# Patient Record
Sex: MALE | Race: ASIAN | NOT HISPANIC OR LATINO
[De-identification: names, ages, dates, MRNs, and addresses within clinical notes are randomized per-mention and may not be internally consistent; named-entity substitution may affect disease eponyms.]

---

## 2021-10-18 PROBLEM — Z00.00 ENCOUNTER FOR PREVENTIVE HEALTH EXAMINATION: Status: ACTIVE | Noted: 2021-10-18

## 2021-11-03 DIAGNOSIS — Q76.2 CONGENITAL SPONDYLOLISTHESIS: ICD-10-CM

## 2021-11-03 DIAGNOSIS — M50.20 OTHER CERVICAL DISC DISPLACEMENT, UNSPECIFIED CERVICAL REGION: ICD-10-CM

## 2021-11-03 DIAGNOSIS — M48.062 SPINAL STENOSIS, LUMBAR REGION WITH NEUROGENIC CLAUDICATION: ICD-10-CM

## 2021-11-03 RX ORDER — CYCLOBENZAPRINE HCL 5 MG
TABLET ORAL
Refills: 0 | Status: ACTIVE | COMMUNITY

## 2021-11-03 RX ORDER — TRAMADOL HYDROCHLORIDE 25 MG/1
TABLET, COATED ORAL
Refills: 0 | Status: ACTIVE | COMMUNITY

## 2021-11-04 ENCOUNTER — RESULT REVIEW (OUTPATIENT)
Age: 36
End: 2021-11-04

## 2021-11-04 ENCOUNTER — APPOINTMENT (OUTPATIENT)
Dept: SPINE | Facility: CLINIC | Age: 36
End: 2021-11-04
Payer: COMMERCIAL

## 2021-11-04 ENCOUNTER — OUTPATIENT (OUTPATIENT)
Dept: OUTPATIENT SERVICES | Facility: HOSPITAL | Age: 36
LOS: 1 days | End: 2021-11-04
Payer: COMMERCIAL

## 2021-11-04 VITALS
HEIGHT: 67 IN | WEIGHT: 180 LBS | HEART RATE: 68 BPM | BODY MASS INDEX: 28.25 KG/M2 | TEMPERATURE: 97.8 F | OXYGEN SATURATION: 99 % | SYSTOLIC BLOOD PRESSURE: 120 MMHG | DIASTOLIC BLOOD PRESSURE: 73 MMHG

## 2021-11-04 DIAGNOSIS — Z87.891 PERSONAL HISTORY OF NICOTINE DEPENDENCE: ICD-10-CM

## 2021-11-04 DIAGNOSIS — Z78.9 OTHER SPECIFIED HEALTH STATUS: ICD-10-CM

## 2021-11-04 PROCEDURE — 72114 X-RAY EXAM L-S SPINE BENDING: CPT | Mod: 26

## 2021-11-04 PROCEDURE — 99203 OFFICE O/P NEW LOW 30 MIN: CPT

## 2021-11-04 PROCEDURE — 72114 X-RAY EXAM L-S SPINE BENDING: CPT

## 2021-11-04 PROCEDURE — 72052 X-RAY EXAM NECK SPINE 6/>VWS: CPT | Mod: 26

## 2021-11-04 PROCEDURE — 72052 X-RAY EXAM NECK SPINE 6/>VWS: CPT

## 2021-11-04 NOTE — HISTORY OF PRESENT ILLNESS
[de-identified] : Patient is a 35 yo M with no significant PMH presents for neurosurgical evaluation.\par He reports progressively worsening lower back pain initially started in 2009 without injury. Pain is described as moderate to severe dull/tightness on his lower back with recently noticed LEFT buttocks,lateral calf and toes shooting pain with numbness on his bottom of L foot worsening by turning/standing/walking/sitting and alleviated by leaning forward/physical therapy. He also noticed mild to moderate posterior neck pain radiating to b/l shoulders and R arm paresthesia worsening by neck movement for the last 3-4 years. Currently taking Tramadol with moderate relief and PT with good improvement. He never tried any injections with pain management. He denies BB dysfunction, balance problem, difficulty performing ADLs, sexual dysfunction.\par He ambulates without assistive device.

## 2021-11-04 NOTE — REASON FOR VISIT
[Initial Visit] : an initial visit for [Back Pain] : back pain [Neck Pain] : neck pain [FreeTextEntry2] : referred by PCP (Dr. Mima Marion @ [facility])

## 2021-11-04 NOTE — PHYSICAL EXAM
[Normal] : Gait: normal [] : Motor: [UE Motor Strength NL] : Motor strength of the bilateral upper extremities is normal [LE Motor Strength NL] : Motor strength of the bilateral lower extremities was normal [2+] : left ankle jerk 2+ [Tam's Sign] : negative Tam's sign [de-identified] : negative Romberg\par normal tandem gait [de-identified] : MRI C/T/L spine wo on 10/13/2021 @ [facility] showed severe lumbar stenosis @ L4-5 with grade II spondylolisthesis, moderate C5-6 LEFT side paracentral disc disease without cord compression or signal changes as well as T1 -2 L paracentral herniated disc without compression.\par Xray C/L spine 6 views today in PACS showed L4-5 spondylolisthesis with motion dynamic

## 2021-11-04 NOTE — ASSESSMENT
[FreeTextEntry1] : Images were reviewed with Dr. White today. Neuro exam is unremarkable today.\par Given stable exam and symptoms improvement, surgical intervention for lumbar spine (L4-5 decompression/fusion) can be considered when he failed conservative management or new/worsening neuro deficits. \par \par PLAN\par - continue PT\par - pain management referral for injection trials\par - RTC in 2-3 months to reassess symptoms